# Patient Record
Sex: MALE | Race: WHITE | Employment: FULL TIME | URBAN - METROPOLITAN AREA
[De-identification: names, ages, dates, MRNs, and addresses within clinical notes are randomized per-mention and may not be internally consistent; named-entity substitution may affect disease eponyms.]

---

## 2020-09-02 ENCOUNTER — HOSPITAL ENCOUNTER (EMERGENCY)
Facility: CLINIC | Age: 35
Discharge: HOME | End: 2020-09-02
Attending: EMERGENCY MEDICINE | Admitting: EMERGENCY MEDICINE

## 2020-09-02 VITALS
BODY MASS INDEX: 22.38 KG/M2 | HEART RATE: 95 BPM | WEIGHT: 180 LBS | OXYGEN SATURATION: 100 % | HEIGHT: 75 IN | TEMPERATURE: 97.6 F | SYSTOLIC BLOOD PRESSURE: 133 MMHG | RESPIRATION RATE: 18 BRPM | DIASTOLIC BLOOD PRESSURE: 91 MMHG

## 2020-09-02 DIAGNOSIS — T78.40XA ALLERGIC REACTION, INITIAL ENCOUNTER: ICD-10-CM

## 2020-09-02 LAB
ALBUMIN UR-MCNC: 10 MG/DL
APPEARANCE UR: CLEAR
BILIRUB UR QL STRIP: NEGATIVE
COLOR UR AUTO: YELLOW
GLUCOSE UR STRIP-MCNC: >1000 MG/DL
HGB UR QL STRIP: NEGATIVE
KETONES UR STRIP-MCNC: 5 MG/DL
LEUKOCYTE ESTERASE UR QL STRIP: NEGATIVE
MUCOUS THREADS #/AREA URNS LPF: PRESENT /LPF
NITRATE UR QL: NEGATIVE
PH UR STRIP: 5.5 PH (ref 5–7)
RBC #/AREA URNS AUTO: 0 /HPF (ref 0–2)
SOURCE: ABNORMAL
SP GR UR STRIP: 1.03 (ref 1–1.03)
SQUAMOUS #/AREA URNS AUTO: <1 /HPF (ref 0–1)
UROBILINOGEN UR STRIP-MCNC: NORMAL MG/DL (ref 0–2)
WBC #/AREA URNS AUTO: 0 /HPF (ref 0–5)

## 2020-09-02 PROCEDURE — 99283 EMERGENCY DEPT VISIT LOW MDM: CPT

## 2020-09-02 PROCEDURE — 81001 URINALYSIS AUTO W/SCOPE: CPT | Performed by: EMERGENCY MEDICINE

## 2020-09-02 PROCEDURE — 99283 EMERGENCY DEPT VISIT LOW MDM: CPT | Mod: Z6 | Performed by: EMERGENCY MEDICINE

## 2020-09-02 ASSESSMENT — ENCOUNTER SYMPTOMS
SHORTNESS OF BREATH: 0
DIFFICULTY URINATING: 0
COLOR CHANGE: 0
HEADACHES: 0
NECK STIFFNESS: 0
EYE REDNESS: 0
ABDOMINAL PAIN: 0
CONFUSION: 0
FACIAL SWELLING: 1
FEVER: 0
ARTHRALGIAS: 0

## 2020-09-02 ASSESSMENT — MIFFLIN-ST. JEOR: SCORE: 1842.1

## 2020-09-02 NOTE — DISCHARGE INSTRUCTIONS
Continue prednisone-take as directed.  Continue Benadryl.    Follow-up with your primary care provider and allergist as planned.    Return to emergency department if trouble breathing, trouble swallowing, fever, or other concerns.

## 2020-09-02 NOTE — ED AVS SNAPSHOT
[facility], Emergency Department  [address] [place] [id number]  Phone:  [phone]                                    Graham Sethi   MRN: [id number]    Department:  [facility], Emergency Department   Date of Visit:  9/2/2020           After Visit Summary Signature Page    I have received my discharge instructions, and my questions have been answered. I have discussed any challenges I see with this plan with the nurse or doctor.    ..........................................................................................................................................  Patient/Patient Representative Signature      ..........................................................................................................................................  Patient Representative Print Name and Relationship to Patient    ..................................................               ................................................  Date                                   Time    ..........................................................................................................................................  Reviewed by Signature/Title    ...................................................              ..............................................  Date                                               Time          22EPIC Rev 08/18

## 2020-09-02 NOTE — ED TRIAGE NOTES
35Y M patient - presenting to ED for eval of body rash and facial swelling; started Sunday.  Per patient the rash/hives seem to be dissipating ([facility]; given [facility]one and benadryl).  No throat swelling / change in voice noted.

## 2020-09-02 NOTE — ED PROVIDER NOTES
ED Provider Note  [facility]      History     Chief Complaint   Patient presents with     Facial Swelling     Rash     HPI  Graham Sethi is a 35 year old male who presents to the emergency department for further evaluation of ongoing facial swelling and urticaria.  His symptoms began 4 days ago.  [facility] and started on Benadryl and prednisone.  He reports that he took prednisone yesterday but not today.  This morning, he went to [facility] for ongoing symptoms.  He was given an EpiPen, Benadryl, and Solu-Medrol.  He had little to no improvement in his symptoms still plan was for admission with patient left as there was no bed immediately available.  The patient was apparently going to go to [facility] with his brother but came to the [facility] emergency department instead.  He denies any chest pain.  He denies any ongoing dyspnea.  No difficulty swallowing.  No fever.  He denies any dysuria, urgency, or frequency.  He denies any leg swelling.  He does complain of some ongoing eyelid swelling, hand swelling, and urticaria over his trunk and extremities.  He has been taking Benadryl.  As noted above, he did not take prednisone today but was given Solu-Medrol at [facility].    Past Medical History  Past Medical History:   Diagnosis Date     Localized superficial swelling, mass, or lump     6/14/06,Forehead, removed     Personal history of poisoning, presenting hazards to health     12/13/2005     Past Surgical History:   Procedure Laterality Date     OTHER SURGICAL HISTORY      80576.0,PAST SURGICAL HISTORY,6/14/06  Nodule removed from forehead     No current outpatient medications on file.    Allergies no known allergies  Family History  No family history on file.  Social History   Social History     Tobacco Use     Smoking status: Not on file   Substance Use Topics     Alcohol use: Not on file     Drug use: Not on file  "     Past medical history, past surgical history, medications, allergies, family history, and social history were reviewed with the patient. No additional pertinent items.       Review of Systems   Constitutional: Negative for fever.   HENT: Positive for facial swelling. Negative for congestion.    Eyes: Negative for redness.   Respiratory: Negative for shortness of breath.    Cardiovascular: Negative for chest pain.   Gastrointestinal: Negative for abdominal pain.   Genitourinary: Negative for difficulty urinating.   Musculoskeletal: Negative for arthralgias and neck stiffness.   Skin: Positive for rash. Negative for color change.   Neurological: Negative for headaches.   Psychiatric/Behavioral: Negative for confusion.   All other systems reviewed and are negative.    A complete review of systems was performed with pertinent positives and negatives noted in the HPI, and all other systems negative.    Physical Exam   BP: (!) 145/78  Pulse: 110  Temp: 97.6  F (36.4  C)  Resp: 16  Height: 190.5 cm (6' 3\")  Weight: 81.6 kg (180 lb)  SpO2: 100 %  Physical Exam  Vitals signs and nursing note reviewed.   Constitutional:       General: He is not in acute distress.     Appearance: He is not diaphoretic.   HENT:      Head: Normocephalic and atraumatic.   Eyes:      General: No scleral icterus.     Pupils: Pupils are equal, round, and reactive to light.      Comments: Upper eyelids swollen   Neck:      Musculoskeletal: Normal range of motion.   Cardiovascular:      Rate and Rhythm: Normal rate and regular rhythm.      Pulses: Normal pulses.   Pulmonary:      Effort: Pulmonary effort is normal. No respiratory distress.   Abdominal:      Palpations: Abdomen is soft.      Tenderness: There is no abdominal tenderness.   Musculoskeletal: Normal range of motion.         General: No tenderness.      Comments: Mild diffuse bilateral hand swelling   Skin:     General: Skin is warm.      Findings: Rash present. Rash is urticarial (Over " trunk and extremities.).   Neurological:      Motor: No weakness.      Gait: Gait normal.         ED Course      Procedures        Results for orders placed or performed during the hospital encounter of 09/02/20   UA with Microscopic     Status: Abnormal   Result Value Ref Range    Color Urine Yellow     Appearance Urine Clear     Glucose Urine >1000 (A) NEG^Negative mg/dL    Bilirubin Urine Negative NEG^Negative    Ketones Urine 5 (A) NEG^Negative mg/dL    Specific Gravity Urine 1.030 1.003 - 1.035    Blood Urine Negative NEG^Negative    pH Urine 5.5 5.0 - 7.0 pH    Protein Albumin Urine 10 (A) NEG^Negative mg/dL    Urobilinogen mg/dL Normal 0.0 - 2.0 mg/dL    Nitrite Urine Negative NEG^Negative    Leukocyte Esterase Urine Negative NEG^Negative    Source Midstream Urine     WBC Urine 0 0 - 5 /HPF    RBC Urine 0 0 - 2 /HPF    Squamous Epithelial /HPF Urine <1 0 - 1 /HPF    Mucous Urine Present (A) NEG^Negative /LPF      Results for orders placed or performed during the hospital encounter of 09/02/20   UA with Microscopic     Status: Abnormal   Result Value Ref Range    Color Urine Yellow     Appearance Urine Clear     Glucose Urine >1000 (A) NEG^Negative mg/dL    Bilirubin Urine Negative NEG^Negative    Ketones Urine 5 (A) NEG^Negative mg/dL    Specific Gravity Urine 1.030 1.003 - 1.035    Blood Urine Negative NEG^Negative    pH Urine 5.5 5.0 - 7.0 pH    Protein Albumin Urine 10 (A) NEG^Negative mg/dL    Urobilinogen mg/dL Normal 0.0 - 2.0 mg/dL    Nitrite Urine Negative NEG^Negative    Leukocyte Esterase Urine Negative NEG^Negative    Source Midstream Urine     WBC Urine 0 0 - 5 /HPF    RBC Urine 0 0 - 2 /HPF    Squamous Epithelial /HPF Urine <1 0 - 1 /HPF    Mucous Urine Present (A) NEG^Negative /LPF     Medications - No data to display          Assessments & Plan (with Medical Decision Making)   35 year old male to the emergency department for ongoing bilateral eyelid swelling, bilateral hand swelling, and  truncal as well as extremity urticaria.  He was started on Benadryl and prednisone yesterday but did not take his dose of prednisone today.  He was seen at [facility] earlier today and given epinephrine, Benadryl, and Solu-Medrol.  He was admitted on an observation basis but ultimately left when a bed was not available.  Here, the patient does not have any respiratory compromise.  He does have some mild upper eyelid swelling, bilateral hand swelling, and urticaria.  Urinalysis was checked which did not reveal any significant proteinuria.  He had had laboratory evaluation at [facility] earlier today.  He was offered observation admission here as well but ultimately declined as he is desiring diagnostic testing which will need to be performed once this acute episode has resolved and he is off steroids.  This was explained to the patient and he states understanding.  Patient will be discharged home.  He was encouraged to take his prednisone and Benadryl as directed.  He will follow-up with primary care and allergy.    I have reviewed the nursing notes. I have reviewed the findings, diagnosis, plan and need for follow up with the patient.    New Prescriptions    No medications on file       Final diagnoses:   Allergic reaction, initial encounter       --  Deangelo James Md  [facility], [place], EMERGENCY DEPARTMENT  9/2/2020     Deangelo James MD  09/02/20 [id number]